# Patient Record
(demographics unavailable — no encounter records)

---

## 2025-05-19 NOTE — PHYSICAL EXAM
[General Appearance - Well Developed] : well developed [General Appearance - Well Nourished] : well nourished [de-identified] : Bilateral descended testicles 16cc, nontender, no mass, no varicocele, vas palpable [Chaperone Present] : A chaperone was present in the examining room during all aspects of the physical examination [FreeTextEntry2] : Alcon ANDERSON

## 2025-05-19 NOTE — ASSESSMENT
[FreeTextEntry1] : 26 year old male with fertility testing/infertility and low testosterone Hx of bilateral varicocelectomy 12/6/21 - Dr Rojas  - Hormone panel to assess current state (has been on clomid/anastrozole in past) - Semen test to assess current state (has varied from azoo to severe oligo in past) - Plan pending above. RTC 1 month

## 2025-05-19 NOTE — HISTORY OF PRESENT ILLNESS
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 Conc 0.03.    Subsequent 3/18/22 - Vol 1.5. Conc 1.0.   11/3/22: Vol 3.0. Conc 0.02  7/12/23: Vol 1.0. Azoospermia  Last hormone panel 7/14/23: Testosterone 541. LH 7.0. FSH 1.9   Used to take anastrozole and clomid. He no longer takes these Now just taking vitamins   Regular heat exposure to groin: no Did you undergo puberty around the same time as peers: yes History of supplemental testosterone: no Tobacco/marijuana/drug use: no Alcohol use: no Genital infection/STD hx: no Hx of UDT or trauma to testicles: no  Medical hx: none Relevant surgical hx: bilateral varicocelectomy 12/6/21   Occupation: [employer]  Environmental exposures: no   Partner hx: Name: Deanne Mckinney Age: 31yo Regular periods: normal Gyn eval? normal    ROS: Erection rigidity: normal Orgasm/ejaculation: normal Libido: normal Voiding sx: normal